# Patient Record
(demographics unavailable — no encounter records)

---

## 2017-08-24 NOTE — DIAGNOSTIC IMAGING REPORT
THORACIC SPINE 3 VIEWS ROUTINE



CLINICAL HISTORY: 44 years-old Male presenting with TRAPEZIUS MUSCLE SPASM, back

pain on the right side extending into the lower back and into the upper back on

the no history of injury, pain constant for 8 months. 



TECHNIQUE: 3 views of the thoracic spine were obtained. 



COMPARISON:  None.



FINDINGS:

Normal thoracic kyphosis. Vertebral bodies maintain normal height and alignment.

Intervertebral disc spaces grossly preserved. No radiographic evidence of acute

fracture or subluxation. Minimal degenerative change noted in the lower thoracic

region with diminutive anterior osteophytosis.



Cardiomediastinal silhouette normal. Visualized portion of the lungs and pleural

spaces grossly clear.



IMPRESSION:

Minimal degenerative change in the lower thoracic region. This likely does not

explain the patient's symptomatology. No radiographic evidence of acute fracture

or subluxation.







Electronically signed by:  Vicente De M.D.

8/24/2017 5:01 PM



Dictated Date/Time:  8/24/2017 5:00 PM

## 2017-08-24 NOTE — DIAGNOSTIC IMAGING REPORT
C-SPINE ROUTINE 4 OR 5 VIEWS



CLINICAL HISTORY: 44 years-old Male presenting with TRAPEZIUS MUSCLE SPASM,

right-sided back pain constant for 8 months, no injury. 



TECHNIQUE: Lateral, bilateral oblique, frontal, and open-mouth odontoid views of

the cervical spine were obtained. 



COMPARISON:  None.



FINDINGS:

Normal cervical lordosis. No radiographic evidence of acute fracture or

subluxation. Vertebral bodies maintain normal height and alignment. No osseous

neural foraminal narrowing or significant degenerative changes evident. Lateral

masses of C1 articulate normally with C2. Normal predental interval. No

prevertebral soft tissue swelling.



IMPRESSION:

Normal cervical spine.







Electronically signed by:  Vicente De M.D.

8/24/2017 4:43 PM



Dictated Date/Time:  8/24/2017 4:42 PM

## 2017-08-24 NOTE — DIAGNOSTIC IMAGING REPORT
L-SPINE MIN 4 VIEWS ROUTINE



CLINICAL HISTORY: 44 years-old Male presenting with TRAPEZIUS MUSCLE SPASM,

right-sided back pain extending into the lower back and into the upper back, no

history of injury, constant pain for 8 months. 



TECHNIQUE: Frontal, bilateral oblique, and lateral views of the lumbar spine and

coned in lateral view of the lumbosacral junction were obtained. 



COMPARISON:  None.



FINDINGS:

Normal lumbar lordosis. Vertebral bodies maintain normal height and alignment.

Intervertebral disc spaces preserved. No significant degenerative changes

evident. No osseous neural foraminal narrowing. No radiographic evidence of

acute fracture or subluxation.



Diffuse gaseous distention of small bowel. No gross pneumoperitoneum.



IMPRESSION:

1.  Normal lumbar spine.



2.  Diffuse gaseous distention of small bowel could suggest ileus. Further

evaluation with cross-sectional imaging of the abdomen as clinically indicated.







Electronically signed by:  Vicente De M.D.

8/24/2017 4:59 PM



Dictated Date/Time:  8/24/2017 4:57 PM